# Patient Record
Sex: FEMALE | Race: WHITE
[De-identification: names, ages, dates, MRNs, and addresses within clinical notes are randomized per-mention and may not be internally consistent; named-entity substitution may affect disease eponyms.]

---

## 2019-12-30 ENCOUNTER — HOSPITAL ENCOUNTER (OUTPATIENT)
Dept: HOSPITAL 63 - DXRAD | Age: 64
Discharge: HOME | End: 2019-12-30
Attending: SPECIALIST
Payer: COMMERCIAL
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
## 2019-12-30 DIAGNOSIS — S62.329A: Primary | ICD-10-CM

## 2019-12-30 DIAGNOSIS — M79.89: ICD-10-CM
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
## 2019-12-30 DIAGNOSIS — M76.891: ICD-10-CM

## 2019-12-30 PROCEDURE — 73630 X-RAY EXAM OF FOOT: CPT

## 2019-12-30 PROCEDURE — 73600 X-RAY EXAM OF ANKLE: CPT

## 2019-12-30 NOTE — RAD
EXAM: Right foot, 3 views; right ankle, 3 views.

 

HISTORY: Fall.

 

COMPARISON: None.

 

FINDINGS: 3 views of the right foot and ankle are obtained. There is a 

mildly displaced fracture of the distal fibular metaphysis with 

approximately one third shaft width displacement along the fracture line. 

There is lateral predominant soft tissue swelling. There is a small 

ossicle inferior to the medial malleolus, suggesting avulsion fracture 

fragment of uncertain chronicity. There is cortical irregularity along the

posterior malleolus which may be due to a nondisplaced fracture. The ankle

mortise is intact. There is a small plantar spur.

 

IMPRESSION: 

1. Mild displaced distal fibular metaphyseal fracture.

2. Possible nondisplaced posterior malleolar fracture and tiny avulsion 

fracture along the inferior medial malleolus, of uncertain chronicity.

3. Small plantar spur.

4. Lateral predominant soft tissue swelling.

 

Electronically signed by: Lashanda Garcia MD (12/30/2019 12:37 PM) 

[facility]

## 2019-12-30 NOTE — RAD
EXAM: Right foot, 3 views; right ankle, 3 views.

 

HISTORY: Fall.

 

COMPARISON: None.

 

FINDINGS: 3 views of the right foot and ankle are obtained. There is a 

mildly displaced fracture of the distal fibular metaphysis with 

approximately one third shaft width displacement along the fracture line. 

There is lateral predominant soft tissue swelling. There is a small 

ossicle inferior to the medial malleolus, suggesting avulsion fracture 

fragment of uncertain chronicity. There is cortical irregularity along the

posterior malleolus which may be due to a nondisplaced fracture. The ankle

mortise is intact. There is a small plantar spur.

 

IMPRESSION: 

1. Mild displaced distal fibular metaphyseal fracture.

2. Possible nondisplaced posterior malleolar fracture and tiny avulsion 

fracture along the inferior medial malleolus, of uncertain chronicity.

3. Small plantar spur.

4. Lateral predominant soft tissue swelling.

 

Electronically signed by: Lashanda Garcia MD (12/30/2019 12:37 PM) 

[facility]